# Patient Record
(demographics unavailable — no encounter records)

---

## 2025-06-23 NOTE — CONSULT LETTER
[Dear  ___] : Dear  [unfilled], [Courtesy Letter:] : I had the pleasure of seeing your patient, [unfilled], in my office today. [Please see my note below.] : Please see my note below. [Sincerely,] : Sincerely, [FreeTextEntry3] : Stuart Ray MD, FACS, FASCRS Colorectal Surgery [facility] Assistant Professor of Surgery [employer] at [address], [address] [place] [address] Tel: [phone]  Cell: [phone]  Email: [email or web address]

## 2025-06-23 NOTE — PHYSICAL EXAM
[Normal Breath Sounds] : Normal breath sounds [Normal Heart Sounds] : normal heart sounds [Normal Rate and Rhythm] : normal rate and rhythm [No Rash or Lesion] : No rash or lesion [Alert] : alert [Oriented to Person] : oriented to person [Oriented to Place] : oriented to place [Oriented to Time] : oriented to time [Calm] : calm [de-identified] : soft, NT  [de-identified] : NAD [de-identified] : NC/AT [de-identified] : +ROM [de-identified] : intact

## 2025-06-23 NOTE — HISTORY OF PRESENT ILLNESS
[FreeTextEntry1] : 82 y/o female here for interest in Solesta injections regarding fecal incontinence  Pt has been experiencing fecal incontinence since 2024. She is s/p rectal prolapse repair in September 2024 in [place], but is still experiencing worsening fecal incontinence. She did Pelvic floor therapy for a few months, but her fecal incontinence did not improve  her symptoms.   She did have an SNS device placed by Dr. Abdi in [place], but it did not resolve her fecal incontinence. The only thing that helps her is taking Imodium as needed. She currently has 1-5 episodes on fecal incontinence daily.  Pt denies abdominal pain, n/v, constipation, diarrhea, and bleeding.  Colonoscopy January 2025

## 2025-06-23 NOTE — HISTORY OF PRESENT ILLNESS
[FreeTextEntry1] : 82 y/o female here for interest in Solesta injections regarding fecal incontinence  Pt has been experiencing fecal incontinence since 2024. She is s/p rectal prolapse repair in September 2024 in [place], but is still experiencing worsening fecal incontinence. She did Pelvic floor therapy for a few months, but her fecal incontinence did not improve  her symptoms.   She did have an SNS device placed by Dr. Abdi in [place], but it did not resolve her fecal incontinence. The only thing that helps her is taking Imodium as needed. She currently has 1-5 episodes on fecal incontinence daily.  Pt denies abdominal pain, n/v, constipation, diarrhea, and bleeding.  Colonoscopy January 2025   Hypertensive urgency

## 2025-06-23 NOTE — REVIEW OF SYSTEMS
[As Noted in HPI] : as noted in HPI [Easy Bruising] : a tendency for easy bruising [Negative] : Psychiatric [FreeTextEntry4] : s/p cataract sx; mild glaucoma [FreeTextEntry7] : +fecal incontinence  [de-identified] : Macimotos

## 2025-06-23 NOTE — CONSULT LETTER
[Dear  ___] : Dear  [unfilled], [Courtesy Letter:] : I had the pleasure of seeing your patient, [unfilled], in my office today. [Please see my note below.] : Please see my note below. [Sincerely,] : Sincerely, [FreeTextEntry3] : Stuart Ray MD, FACS, FASCRS Colorectal Surgery [facility] Assistant Professor of Surgery [employer] at [address], [address] [place] [address] Tel: [phone]  Cell: [phone]  Email: [email or web address]

## 2025-06-23 NOTE — PHYSICAL EXAM
[Normal Breath Sounds] : Normal breath sounds [Normal Heart Sounds] : normal heart sounds [Normal Rate and Rhythm] : normal rate and rhythm [No Rash or Lesion] : No rash or lesion [Alert] : alert [Oriented to Person] : oriented to person [Oriented to Place] : oriented to place [Oriented to Time] : oriented to time [Calm] : calm [de-identified] : soft, NT  [de-identified] : NAD [de-identified] : NC/AT [de-identified] : +ROM [de-identified] : intact

## 2025-06-23 NOTE — REVIEW OF SYSTEMS
[As Noted in HPI] : as noted in HPI [Easy Bruising] : a tendency for easy bruising [Negative] : Psychiatric [FreeTextEntry4] : s/p cataract sx; mild glaucoma [FreeTextEntry7] : +fecal incontinence  [de-identified] : Macimotos

## 2025-06-23 NOTE — ASSESSMENT
[FreeTextEntry1] : The patient presents today to request solesta injection r/b/a of the injection are d/w her including but not limited to pain, bleeding, and infection, and failure to improve her symptoms.  She will f/u once the product arrives in our office

## 2025-07-11 NOTE — PHYSICAL EXAM
[Excoriation] : no perianal excoriation [Fistula] : no fistulas [Wart] : no warts [Ulcer ___ cm] : no ulcers [Normal] : was normal [None] : there was no rectal mass  [Normal Breath Sounds] : Normal breath sounds [Wheezing] : no wheezing was heard [Normal Heart Sounds] : normal heart sounds [Normal Rate and Rhythm] : normal rate and rhythm [Alert] : alert [Oriented to Person] : oriented to person [Oriented to Place] : oriented to place [Oriented to Time] : oriented to time [Calm] : calm [de-identified] : soft, NT/ND [de-identified] : Small external hemorrhoids [de-identified] : NAD [de-identified] : NCAT [de-identified] : supple [de-identified] : LITA [de-identified] : warm

## 2025-07-11 NOTE — PROCEDURE
[FreeTextEntry1] : Pre procedure diagnosis: fecal incontinence Post procedure diagnosis: same Anesthesia: none EBL: none Specimen: none Complications: none  Procedure: A well lubricated lighted anoscope is passed through the anus into the rectum. The mucosa is carefully inspected as the scope is withdrawn. Enlarged internal hemorrhoids are seen. No proctitis. 4 separate injections of SOlesta are administered into the submucosal space in the anterior & posterior midline and left & right lateral The patient tolerated it well